# Patient Record
(demographics unavailable — no encounter records)

---

## 2025-06-23 NOTE — HISTORY OF PRESENT ILLNESS
[FreeTextEntry1] : 28 year old male presents the office for fertility evaluation. Patient and partner has been trying for 2 years. Patient reports that he went to a fertility clinic [facility] and had semen analysis performed which demonstrated low morphology.  Partner age 28 (partner dx PCOS) Both patient and partner together since 16 and involved in pregnancy at that time (TOP) Denies radiation/chemical exposure, change in teste size and surgeries to Groin HX of STD

## 2025-06-23 NOTE — PHYSICAL EXAM
[Normal Appearance] : normal appearance [] : no respiratory distress [Urethral Meatus] : meatus normal [Penis Abnormality] : normal circumcised penis [Rectal Exam - Seminal Vesicles] : the seminal vesicles were normal [Epididymis] : the epididymides were normal [Testes Tenderness] : no tenderness of the testes [Testes Mass (___cm)] : there were no testicular masses [Normal Station and Gait] : the gait and station were normal for the patient's age [Oriented To Time, Place, And Person] : oriented to person, place, and time [Chaperone Present] : A chaperone was present in the examining room during all aspects of the physical examination [FreeTextEntry2] : Dr Mckeon [FreeTextEntry3] : Dima Lundberg NP exam